# Patient Record
Sex: MALE | Race: OTHER | NOT HISPANIC OR LATINO | Employment: OTHER | URBAN - NONMETROPOLITAN AREA
[De-identification: names, ages, dates, MRNs, and addresses within clinical notes are randomized per-mention and may not be internally consistent; named-entity substitution may affect disease eponyms.]

---

## 2021-01-04 ENCOUNTER — OFFICE VISIT (OUTPATIENT)
Dept: CARDIOLOGY | Facility: CLINIC | Age: 51
End: 2021-01-04

## 2021-01-04 VITALS
SYSTOLIC BLOOD PRESSURE: 155 MMHG | HEIGHT: 72 IN | OXYGEN SATURATION: 99 % | BODY MASS INDEX: 34.51 KG/M2 | TEMPERATURE: 97.1 F | HEART RATE: 63 BPM | DIASTOLIC BLOOD PRESSURE: 84 MMHG | WEIGHT: 254.8 LBS

## 2021-01-04 DIAGNOSIS — R06.02 SHORTNESS OF BREATH: ICD-10-CM

## 2021-01-04 DIAGNOSIS — R07.2 PRECORDIAL PAIN: Primary | ICD-10-CM

## 2021-01-04 DIAGNOSIS — R00.2 PALPITATIONS: ICD-10-CM

## 2021-01-04 PROCEDURE — 99204 OFFICE O/P NEW MOD 45 MIN: CPT | Performed by: PHYSICIAN ASSISTANT

## 2021-01-04 RX ORDER — TAMSULOSIN HYDROCHLORIDE 0.4 MG/1
1 CAPSULE ORAL DAILY
COMMUNITY
End: 2022-03-10

## 2021-01-04 RX ORDER — HYDROCODONE BITARTRATE AND ACETAMINOPHEN 10; 325 MG/1; MG/1
1 TABLET ORAL 2 TIMES DAILY
COMMUNITY
End: 2022-03-10

## 2021-01-04 RX ORDER — NITROGLYCERIN 0.4 MG/1
0.4 TABLET SUBLINGUAL
COMMUNITY
End: 2022-03-10 | Stop reason: SDUPTHER

## 2021-01-04 RX ORDER — OMEPRAZOLE 40 MG/1
40 CAPSULE, DELAYED RELEASE ORAL DAILY
COMMUNITY

## 2021-01-04 NOTE — PROGRESS NOTES
Subjective   Wyatt Aguayo is a 50 y.o. male     Chief Complaint   Patient presents with   • Chest Pain     presents for cardiac eval   • Palpitations   • Shortness of Breath       HPI    This pleasant gentleman presents to the clinic today for initial evaluation.  He is referred because of chest tightness/chest discomfort, also because of his significant family history of CAD.  The patient reports catheterization 10 to 12 years ago which he feels was normal.  He has never had cardiac intervention of any kind by his report.  He tells me that over the last few weeks, he has started noticing chest tightness.  This can occur with exertion or with stress.  This can last for a few minutes and then typically resolves.  Occasionally, particularly during 1 recent episode, he will have referral to the left upper extremity.  He then notes onset of dyspnea which is fairly significant.  He will note palpitations at that time as well.  He has no associated dizziness or syncope.  Symptoms do appear to be progressing and appear to be worsening somewhat.  He reported this to his primary care provider recently and has now been referred on for cardiac evaluation.  The patient denies further cardiovascular complaints or issues otherwise.  Of note, recent laboratories were largely benign.  Lipid parameters were largely stable with the exception of hypertriglyceridemia.  I have reviewed this with him and he opts at this time to proceed with lifestyle modifications.  EKG also performed during his primary care provider's office recently indicated sinus rhythm but nothing acute or nothing significant.    Current Outpatient Medications   Medication Sig Dispense Refill   • HYDROcodone-acetaminophen (NORCO)  MG per tablet Take 1 tablet by mouth 2 (two) times a day.     • nitroglycerin (NITROSTAT) 0.4 MG SL tablet Place 0.4 mg under the tongue Every 5 (Five) Minutes As Needed for Chest Pain. Take no more than 3 doses in 15 minutes.     •  omeprazole (priLOSEC) 40 MG capsule Take 40 mg by mouth Daily.     • sertraline (ZOLOFT) 50 MG tablet Take 50 mg by mouth Daily.     • tamsulosin (FLOMAX) 0.4 MG capsule 24 hr capsule Take 1 capsule by mouth Daily.       No current facility-administered medications for this visit.        Bactrim [sulfamethoxazole-trimethoprim] and Benadryl allergy [diphenhydramine hcl (sleep)]    Past Medical History:   Diagnosis Date   • DDD (degenerative disc disease), lumbar        Social History     Socioeconomic History   • Marital status:      Spouse name: Not on file   • Number of children: Not on file   • Years of education: Not on file   • Highest education level: Not on file   Tobacco Use   • Smoking status: Never Smoker   • Smokeless tobacco: Never Used   Substance and Sexual Activity   • Alcohol use: Yes     Comment: occas   • Drug use: Never       Family History   Problem Relation Age of Onset   • Heart disease Mother    • Heart attack Father        Review of Systems   Constitutional: Positive for diaphoresis (night sweats) and fatigue. Negative for chills and fever.   HENT: Negative.    Eyes: Positive for visual disturbance.   Respiratory: Positive for shortness of breath (on exertion and with CP). Negative for apnea, cough, chest tightness and wheezing.    Cardiovascular: Positive for chest pain (pressure), palpitations (pounding) and leg swelling (R ankle due to previous injury).   Gastrointestinal: Negative.  Negative for abdominal pain, blood in stool, constipation, diarrhea, nausea and vomiting.   Endocrine: Negative.    Genitourinary: Negative.  Negative for hematuria.   Musculoskeletal: Positive for arthralgias and back pain. Negative for myalgias and neck pain.   Skin: Negative.    Allergic/Immunologic: Negative.  Negative for environmental allergies and food allergies.   Neurological: Positive for dizziness (with quick movement). Negative for syncope, weakness, light-headedness, numbness and headaches.  "  Hematological: Negative.  Does not bruise/bleed easily.   Psychiatric/Behavioral: Negative.  Negative for agitation and sleep disturbance. The patient is not nervous/anxious.        Objective     Vitals:    01/04/21 1046   BP: 155/84   BP Location: Left arm   Patient Position: Sitting   Pulse: 63   Temp: 97.1 °F (36.2 °C)   SpO2: 99%   Weight: 116 kg (254 lb 12.8 oz)   Height: 182.9 cm (72\")        /84 (BP Location: Left arm, Patient Position: Sitting)   Pulse 63   Temp 97.1 °F (36.2 °C)   Ht 182.9 cm (72\")   Wt 116 kg (254 lb 12.8 oz)   SpO2 99%   BMI 34.56 kg/m²      Lab Results (most recent)     None          Physical Exam  Vitals signs and nursing note reviewed.   Constitutional:       General: He is not in acute distress.     Appearance: He is well-developed.   HENT:      Head: Normocephalic and atraumatic.   Eyes:      Conjunctiva/sclera: Conjunctivae normal.      Pupils: Pupils are equal, round, and reactive to light.   Neck:      Musculoskeletal: Normal range of motion and neck supple.      Vascular: No JVD.      Trachea: No tracheal deviation.   Cardiovascular:      Rate and Rhythm: Normal rate and regular rhythm.      Heart sounds: Normal heart sounds.   Pulmonary:      Effort: Pulmonary effort is normal.      Breath sounds: Normal breath sounds.   Abdominal:      General: Bowel sounds are normal. There is no distension.      Palpations: Abdomen is soft. There is no mass.      Tenderness: There is no abdominal tenderness. There is no guarding or rebound.   Musculoskeletal: Normal range of motion.         General: No tenderness or deformity.   Skin:     General: Skin is warm and dry.      Coloration: Skin is not pale.      Findings: No erythema or rash.   Neurological:      Mental Status: He is alert and oriented to person, place, and time.   Psychiatric:         Behavior: Behavior normal.         Thought Content: Thought content normal.         Judgment: Judgment normal.         Procedure "   Procedures         Assessment/Plan      Diagnosis Plan   1. Precordial pain  Adult Transthoracic Echo Complete W/ Cont if Necessary Per Protocol    Stress Test With Myocardial Perfusion One Day   2. Shortness of breath  Adult Transthoracic Echo Complete W/ Cont if Necessary Per Protocol    Stress Test With Myocardial Perfusion One Day   3. Palpitations  Adult Transthoracic Echo Complete W/ Cont if Necessary Per Protocol    Stress Test With Myocardial Perfusion One Day     1.  With chest discomfort, dyspnea, palpitations, and symptoms otherwise as outlined above, coupled with family history, I feel the patient needs further testing at this time.    2.  We will schedule for stress test which will serve for ischemia assessment.    3.  We will schedule for an echo as well to evaluate LV size and function, valvular morphologies, etc.    4.  For now, I would continue medical regimen without change.    5.  We will see him back to review results of the above and recommend him further at that time.  He will call for any issues.           Wyatt Olivier  reports that he has never smoked. He has never used smokeless tobacco..         Patient's Body mass index is 34.56 kg/m². BMI is above normal parameters. Recommendations include: educational material.           Electronically signed by:

## 2021-01-04 NOTE — PATIENT INSTRUCTIONS

## 2021-02-05 ENCOUNTER — APPOINTMENT (OUTPATIENT)
Dept: CARDIOLOGY | Facility: HOSPITAL | Age: 51
End: 2021-02-05

## 2021-03-15 ENCOUNTER — APPOINTMENT (OUTPATIENT)
Dept: CARDIOLOGY | Facility: HOSPITAL | Age: 51
End: 2021-03-15

## 2021-03-18 ENCOUNTER — TELEPHONE (OUTPATIENT)
Dept: CARDIOLOGY | Facility: CLINIC | Age: 51
End: 2021-03-18

## 2021-03-18 NOTE — TELEPHONE ENCOUNTER
Patient cancelled testing and then no-showed testing once it was rescheduled.     Spoke w/ Hu Holt's office to make them aware. CORNELL MONSALVE.

## 2021-04-02 ENCOUNTER — HOSPITAL ENCOUNTER (OUTPATIENT)
Dept: CARDIOLOGY | Facility: HOSPITAL | Age: 51
Discharge: HOME | End: 2021-04-02

## 2021-04-02 DIAGNOSIS — R07.2 PRECORDIAL PAIN: ICD-10-CM

## 2021-04-02 DIAGNOSIS — R00.2 PALPITATIONS: ICD-10-CM

## 2021-04-02 DIAGNOSIS — R06.02 SHORTNESS OF BREATH: ICD-10-CM

## 2021-04-02 PROCEDURE — 0 TECHNETIUM SESTAMIBI: Performed by: INTERNAL MEDICINE

## 2021-04-02 PROCEDURE — 93306 TTE W/DOPPLER COMPLETE: CPT

## 2021-04-02 PROCEDURE — 93018 CV STRESS TEST I&R ONLY: CPT | Performed by: INTERNAL MEDICINE

## 2021-04-02 PROCEDURE — 93306 TTE W/DOPPLER COMPLETE: CPT | Performed by: INTERNAL MEDICINE

## 2021-04-02 PROCEDURE — 25010000002 REGADENOSON 0.4 MG/5ML SOLUTION: Performed by: INTERNAL MEDICINE

## 2021-04-02 PROCEDURE — 93017 CV STRESS TEST TRACING ONLY: CPT

## 2021-04-02 PROCEDURE — 93016 CV STRESS TEST SUPVJ ONLY: CPT | Performed by: NURSE PRACTITIONER

## 2021-04-02 PROCEDURE — A9500 TC99M SESTAMIBI: HCPCS | Performed by: INTERNAL MEDICINE

## 2021-04-02 PROCEDURE — 78452 HT MUSCLE IMAGE SPECT MULT: CPT | Performed by: INTERNAL MEDICINE

## 2021-04-02 PROCEDURE — 78452 HT MUSCLE IMAGE SPECT MULT: CPT

## 2021-04-02 RX ADMIN — TECHNETIUM TC 99M SESTAMIBI 1 DOSE: 1 INJECTION INTRAVENOUS at 10:23

## 2021-04-02 RX ADMIN — REGADENOSON 0.4 MG: 0.08 INJECTION, SOLUTION INTRAVENOUS at 10:23

## 2021-04-02 RX ADMIN — TECHNETIUM TC 99M SESTAMIBI 1 DOSE: 1 INJECTION INTRAVENOUS at 08:57

## 2021-04-04 LAB
BH CV REST NUCLEAR ISOTOPE DOSE: 10 MCI
BH CV STRESS COMMENTS STAGE 1: NORMAL
BH CV STRESS DOSE REGADENOSON STAGE 1: 0.4
BH CV STRESS DURATION MIN STAGE 1: 0
BH CV STRESS DURATION SEC STAGE 1: 10
BH CV STRESS NUCLEAR ISOTOPE DOSE: 30 MCI
BH CV STRESS PROTOCOL 1: NORMAL
BH CV STRESS RECOVERY BP: NORMAL MMHG
BH CV STRESS RECOVERY HR: 56 BPM
BH CV STRESS STAGE 1: 1
MAXIMAL PREDICTED HEART RATE: 171 BPM
PERCENT MAX PREDICTED HR: 36.84 %
STRESS BASELINE BP: NORMAL MMHG
STRESS BASELINE HR: 51 BPM
STRESS PERCENT HR: 43 %
STRESS POST PEAK BP: NORMAL MMHG
STRESS POST PEAK HR: 63 BPM
STRESS TARGET HR: 145 BPM

## 2021-04-07 ENCOUNTER — TELEPHONE (OUTPATIENT)
Dept: CARDIOLOGY | Facility: CLINIC | Age: 51
End: 2021-04-07

## 2021-04-07 NOTE — TELEPHONE ENCOUNTER
Spoke to patient testing results - normal , keep follow up.      Patient verbalized understanding     AT [facility]             ----- Message from SOLIS Melendez sent at 4/7/2021 10:59 AM EDT -----  Routine follow-up.

## 2021-04-14 LAB
BH CV ECHO MEAS - AO MAX PG (FULL): 5.5 MMHG
BH CV ECHO MEAS - AO MAX PG: 10.8 MMHG
BH CV ECHO MEAS - AO MEAN PG (FULL): 2 MMHG
BH CV ECHO MEAS - AO MEAN PG: 5 MMHG
BH CV ECHO MEAS - AO ROOT AREA (BSA CORRECTED): 1.4
BH CV ECHO MEAS - AO ROOT AREA: 8 CM^2
BH CV ECHO MEAS - AO ROOT DIAM: 3.2 CM
BH CV ECHO MEAS - AO V2 MAX: 164 CM/SEC
BH CV ECHO MEAS - AO V2 MEAN: 110 CM/SEC
BH CV ECHO MEAS - AO V2 VTI: 35.9 CM
BH CV ECHO MEAS - ASC AORTA: 3 CM
BH CV ECHO MEAS - BSA(HAYCOCK): 2.5 M^2
BH CV ECHO MEAS - BSA: 2.4 M^2
BH CV ECHO MEAS - BZI_BMI: 34.4 KILOGRAMS/M^2
BH CV ECHO MEAS - BZI_METRIC_HEIGHT: 182.9 CM
BH CV ECHO MEAS - BZI_METRIC_WEIGHT: 115.2 KG
BH CV ECHO MEAS - EDV(CUBED): 116.2 ML
BH CV ECHO MEAS - EDV(MOD-SP2): 73.3 ML
BH CV ECHO MEAS - EDV(MOD-SP4): 71.8 ML
BH CV ECHO MEAS - EDV(TEICH): 111.7 ML
BH CV ECHO MEAS - EF(CUBED): 67.9 %
BH CV ECHO MEAS - EF(MOD-SP2): 68.5 %
BH CV ECHO MEAS - EF(MOD-SP4): 62.1 %
BH CV ECHO MEAS - EF(TEICH): 59.3 %
BH CV ECHO MEAS - ESV(CUBED): 37.3 ML
BH CV ECHO MEAS - ESV(MOD-SP2): 23.1 ML
BH CV ECHO MEAS - ESV(MOD-SP4): 27.2 ML
BH CV ECHO MEAS - ESV(TEICH): 45.4 ML
BH CV ECHO MEAS - FS: 31.6 %
BH CV ECHO MEAS - IVS/LVPW: 1
BH CV ECHO MEAS - IVSD: 1 CM
BH CV ECHO MEAS - LA DIMENSION: 3.8 CM
BH CV ECHO MEAS - LA/AO: 1.2
BH CV ECHO MEAS - LV DIASTOLIC VOL/BSA (35-75): 30.4 ML/M^2
BH CV ECHO MEAS - LV IVRT: 0.1 SEC
BH CV ECHO MEAS - LV MASS(C)D: 184.4 GRAMS
BH CV ECHO MEAS - LV MASS(C)DI: 78.2 GRAMS/M^2
BH CV ECHO MEAS - LV MAX PG: 5.3 MMHG
BH CV ECHO MEAS - LV MEAN PG: 3 MMHG
BH CV ECHO MEAS - LV SYSTOLIC VOL/BSA (12-30): 11.5 ML/M^2
BH CV ECHO MEAS - LV V1 MAX: 115 CM/SEC
BH CV ECHO MEAS - LV V1 MEAN: 81.5 CM/SEC
BH CV ECHO MEAS - LV V1 VTI: 24.6 CM
BH CV ECHO MEAS - LVIDD: 4.9 CM
BH CV ECHO MEAS - LVIDS: 3.3 CM
BH CV ECHO MEAS - LVLD AP2: 7.2 CM
BH CV ECHO MEAS - LVLD AP4: 8.1 CM
BH CV ECHO MEAS - LVLS AP2: 6 CM
BH CV ECHO MEAS - LVLS AP4: 6.3 CM
BH CV ECHO MEAS - LVPWD: 1 CM
BH CV ECHO MEAS - MR MAX PG: 23.6 MMHG
BH CV ECHO MEAS - MR MAX VEL: 243 CM/SEC
BH CV ECHO MEAS - MV A MAX VEL: 75.8 CM/SEC
BH CV ECHO MEAS - MV DEC SLOPE: 481 CM/SEC^2
BH CV ECHO MEAS - MV DEC TIME: 0.21 SEC
BH CV ECHO MEAS - MV E MAX VEL: 99.2 CM/SEC
BH CV ECHO MEAS - MV E/A: 1.3
BH CV ECHO MEAS - MV MAX PG: 4.8 MMHG
BH CV ECHO MEAS - MV MEAN PG: 2 MMHG
BH CV ECHO MEAS - MV P1/2T MAX VEL: 111 CM/SEC
BH CV ECHO MEAS - MV P1/2T: 67.6 MSEC
BH CV ECHO MEAS - MV V2 MAX: 110 CM/SEC
BH CV ECHO MEAS - MV V2 MEAN: 56.7 CM/SEC
BH CV ECHO MEAS - MV V2 VTI: 38.4 CM
BH CV ECHO MEAS - MVA P1/2T LCG: 2 CM^2
BH CV ECHO MEAS - MVA(P1/2T): 3.3 CM^2
BH CV ECHO MEAS - PA ACC TIME: 0.11 SEC
BH CV ECHO MEAS - PA MAX PG: 4.8 MMHG
BH CV ECHO MEAS - PA MEAN PG: 3 MMHG
BH CV ECHO MEAS - PA PR(ACCEL): 31.3 MMHG
BH CV ECHO MEAS - PA V2 MAX: 110 CM/SEC
BH CV ECHO MEAS - PA V2 MEAN: 85.5 CM/SEC
BH CV ECHO MEAS - PA V2 VTI: 28 CM
BH CV ECHO MEAS - PULM A REVS DUR: 0.13 SEC
BH CV ECHO MEAS - PULM A REVS VEL: 31.7 CM/SEC
BH CV ECHO MEAS - PULM DIAS VEL: 51 CM/SEC
BH CV ECHO MEAS - PULM S/D: 1
BH CV ECHO MEAS - PULM SYS VEL: 52.3 CM/SEC
BH CV ECHO MEAS - RAP SYSTOLE: 10 MMHG
BH CV ECHO MEAS - RVDD: 3.2 CM
BH CV ECHO MEAS - RVSP: 22.3 MMHG
BH CV ECHO MEAS - SI(AO): 122.4 ML/M^2
BH CV ECHO MEAS - SI(CUBED): 33.5 ML/M^2
BH CV ECHO MEAS - SI(MOD-SP2): 21.3 ML/M^2
BH CV ECHO MEAS - SI(MOD-SP4): 18.9 ML/M^2
BH CV ECHO MEAS - SI(TEICH): 28.1 ML/M^2
BH CV ECHO MEAS - SV(AO): 288.7 ML
BH CV ECHO MEAS - SV(CUBED): 79 ML
BH CV ECHO MEAS - SV(MOD-SP2): 50.2 ML
BH CV ECHO MEAS - SV(MOD-SP4): 44.6 ML
BH CV ECHO MEAS - SV(TEICH): 66.3 ML
BH CV ECHO MEAS - TR MAX VEL: 175 CM/SEC
MAXIMAL PREDICTED HEART RATE: 171 BPM
STRESS TARGET HR: 145 BPM

## 2022-03-10 ENCOUNTER — OFFICE VISIT (OUTPATIENT)
Dept: CARDIOLOGY | Facility: CLINIC | Age: 52
End: 2022-03-10

## 2022-03-10 VITALS
HEIGHT: 70 IN | SYSTOLIC BLOOD PRESSURE: 138 MMHG | WEIGHT: 257 LBS | DIASTOLIC BLOOD PRESSURE: 83 MMHG | OXYGEN SATURATION: 97 % | HEART RATE: 71 BPM | BODY MASS INDEX: 36.79 KG/M2

## 2022-03-10 DIAGNOSIS — R06.02 SHORTNESS OF BREATH: ICD-10-CM

## 2022-03-10 DIAGNOSIS — R07.2 PRECORDIAL PAIN: Primary | ICD-10-CM

## 2022-03-10 DIAGNOSIS — I73.9 CLAUDICATION: ICD-10-CM

## 2022-03-10 DIAGNOSIS — R53.83 FATIGUE, UNSPECIFIED TYPE: ICD-10-CM

## 2022-03-10 DIAGNOSIS — R53.1 WEAKNESS: ICD-10-CM

## 2022-03-10 PROCEDURE — 99214 OFFICE O/P EST MOD 30 MIN: CPT | Performed by: PHYSICIAN ASSISTANT

## 2022-03-10 PROCEDURE — 93000 ELECTROCARDIOGRAM COMPLETE: CPT | Performed by: PHYSICIAN ASSISTANT

## 2022-03-10 RX ORDER — CLOPIDOGREL BISULFATE 75 MG/1
75 TABLET ORAL DAILY
COMMUNITY

## 2022-03-10 RX ORDER — UBIDECARENONE 100 MG
100 CAPSULE ORAL DAILY
COMMUNITY

## 2022-03-10 RX ORDER — CLONIDINE HYDROCHLORIDE 0.1 MG/1
0.1 TABLET ORAL 2 TIMES DAILY
COMMUNITY

## 2022-03-10 RX ORDER — OLMESARTAN MEDOXOMIL 20 MG/1
20 TABLET ORAL DAILY
Qty: 30 TABLET | Refills: 3 | Status: SHIPPED | OUTPATIENT
Start: 2022-03-10 | End: 2022-06-29

## 2022-03-10 RX ORDER — NITROGLYCERIN 0.4 MG/1
0.4 TABLET SUBLINGUAL
Qty: 25 TABLET | Refills: 2 | Status: SHIPPED | OUTPATIENT
Start: 2022-03-10

## 2022-03-10 RX ORDER — HYDROCODONE BITARTRATE AND ACETAMINOPHEN 10; 325 MG/1; MG/1
1 TABLET ORAL EVERY 6 HOURS PRN
COMMUNITY

## 2022-03-10 RX ORDER — ASPIRIN 81 MG/1
81 TABLET ORAL DAILY
COMMUNITY

## 2022-03-10 RX ORDER — ATORVASTATIN CALCIUM 80 MG/1
80 TABLET, FILM COATED ORAL DAILY
COMMUNITY

## 2022-03-10 RX ORDER — AMLODIPINE BESYLATE 10 MG/1
10 TABLET ORAL DAILY
COMMUNITY

## 2022-03-10 RX ORDER — LISINOPRIL 5 MG/1
5 TABLET ORAL DAILY
COMMUNITY
End: 2022-03-10

## 2022-03-10 NOTE — PROGRESS NOTES
Problem list     Subjective   Wyatt Aguayo is a 51 y.o. male     Chief Complaint   Patient presents with   • Follow-up     1 year   • Chest Pain     Post stroke 3 weeks   • Shortness of Breath       HPI  The patient presents back today for evaluation.  We had seen him last in January, 2021.  He was scheduled for cardiac evaluation at that time.  His stress and echo studies at that time were benign.  The patient was lost to follow-up but now presents back after recent hospitalization.  He presented with CVA and eventually had a left carotid endarterectomy.  Medications were adjusted appropriately after CVA and CEA and he presents today to reestablish care.  He just is not doing well.  Since his stroke, he has had ongoing chest pain/tightness.  He has increasing dyspnea.  His fatigue and exercise intolerance is significant.  The patient has no further complaints otherwise and presents for evaluation because of ongoing symptoms.    Current Outpatient Medications on File Prior to Visit   Medication Sig Dispense Refill   • amLODIPine (NORVASC) 10 MG tablet Take 10 mg by mouth Daily.     • aspirin 81 MG EC tablet Take 81 mg by mouth Daily.     • atorvastatin (LIPITOR) 80 MG tablet Take 80 mg by mouth Daily.     • cloNIDine (CATAPRES) 0.1 MG tablet Take 0.1 mg by mouth 2 (Two) Times a Day.     • clopidogrel (PLAVIX) 75 MG tablet Take 75 mg by mouth Daily.     • coenzyme Q10 100 MG capsule Take 100 mg by mouth Daily.     • HYDROcodone-acetaminophen (NORCO)  MG per tablet Take 1 tablet by mouth Every 6 (Six) Hours As Needed for Moderate Pain .     • omeprazole (priLOSEC) 40 MG capsule Take 40 mg by mouth Daily.     • sertraline (ZOLOFT) 50 MG tablet Take 50 mg by mouth Daily.     • [DISCONTINUED] lisinopril (PRINIVIL,ZESTRIL) 5 MG tablet Take 5 mg by mouth Daily.     • [DISCONTINUED] nitroglycerin (NITROSTAT) 0.4 MG SL tablet Place 0.4 mg under the tongue Every 5 (Five) Minutes As Needed for Chest Pain. Take no more  than 3 doses in 15 minutes.     • [DISCONTINUED] HYDROcodone-acetaminophen (NORCO)  MG per tablet Take 1 tablet by mouth 2 (two) times a day.     • [DISCONTINUED] tamsulosin (FLOMAX) 0.4 MG capsule 24 hr capsule Take 1 capsule by mouth Daily.       No current facility-administered medications on file prior to visit.       Bactrim [sulfamethoxazole-trimethoprim] and Benadryl allergy [diphenhydramine hcl (sleep)]    Past Medical History:   Diagnosis Date   • Carotid artery disease (HCC)    • DDD (degenerative disc disease), lumbar    • Stroke (HCC)        Social History     Socioeconomic History   • Marital status:    Tobacco Use   • Smoking status: Never Smoker   • Smokeless tobacco: Never Used   Substance and Sexual Activity   • Alcohol use: Yes     Comment: occas   • Drug use: Never       Family History   Problem Relation Age of Onset   • Heart disease Mother    • Heart attack Father        Review of Systems   Constitutional: Positive for fatigue. Negative for chills and fever.   HENT: Positive for congestion. Negative for rhinorrhea and sore throat.    Eyes: Positive for visual disturbance (glasses).   Respiratory: Positive for chest tightness and shortness of breath. Negative for wheezing.    Cardiovascular: Positive for chest pain and palpitations. Negative for leg swelling.   Gastrointestinal: Negative.    Endocrine: Negative.    Genitourinary: Negative.    Musculoskeletal: Positive for back pain and neck pain. Negative for arthralgias.   Skin: Negative.  Negative for rash and wound.   Allergic/Immunologic: Negative.  Negative for environmental allergies.   Neurological: Positive for weakness and numbness (arms ). Negative for dizziness and headaches.   Hematological: Bruises/bleeds easily (bruises / bleeds).   Psychiatric/Behavioral: Negative.  Negative for sleep disturbance.       Objective   Vitals:    03/10/22 1615   BP: 138/83   BP Location: Left arm   Patient Position: Sitting   Pulse: 71  "  SpO2: 97%   Weight: 117 kg (257 lb)   Height: 177.8 cm (70\")      /83 (BP Location: Left arm, Patient Position: Sitting)   Pulse 71   Ht 177.8 cm (70\")   Wt 117 kg (257 lb)   SpO2 97%   BMI 36.88 kg/m²    Lab Results (most recent)     None        Physical Exam  Vitals and nursing note reviewed.   Constitutional:       General: He is not in acute distress.     Appearance: He is well-developed.   HENT:      Head: Normocephalic and atraumatic.   Eyes:      Conjunctiva/sclera: Conjunctivae normal.      Pupils: Pupils are equal, round, and reactive to light.   Neck:      Vascular: No JVD.      Trachea: No tracheal deviation.   Cardiovascular:      Rate and Rhythm: Normal rate and regular rhythm.      Heart sounds: Normal heart sounds.   Pulmonary:      Effort: Pulmonary effort is normal.      Breath sounds: Normal breath sounds.   Abdominal:      General: Bowel sounds are normal. There is no distension.      Palpations: Abdomen is soft. There is no mass.      Tenderness: There is no abdominal tenderness. There is no guarding or rebound.   Musculoskeletal:         General: No tenderness or deformity. Normal range of motion.      Cervical back: Normal range of motion and neck supple.   Skin:     General: Skin is warm and dry.      Coloration: Skin is not pale.      Findings: No erythema or rash.   Neurological:      Mental Status: He is alert and oriented to person, place, and time.   Psychiatric:         Behavior: Behavior normal.         Thought Content: Thought content normal.         Judgment: Judgment normal.           Procedure     ECG 12 Lead    Date/Time: 3/10/2022 4:21 PM  Performed by: Freddy Main PA  Authorized by: Freddy Main PA   Comparison: compared with previous ECG from 12/7/2020  Comparison to previous ECG: Sinus rhythm, rate 68, normal axis, RSR prime V1 V2, minor nonspecific ST and T wave changes, no acute changes noted.                 Assessment/Plan      Diagnosis Plan   1. " Precordial pain  Stress Test With Myocardial Perfusion One Day    Duplex Lower Extremity Art / Grafts - Bilateral CAR   2. Claudication (HCC)  Stress Test With Myocardial Perfusion One Day    Duplex Lower Extremity Art / Grafts - Bilateral CAR   3. Fatigue, unspecified type  Stress Test With Myocardial Perfusion One Day    Duplex Lower Extremity Art / Grafts - Bilateral CAR   4. Weakness  Stress Test With Myocardial Perfusion One Day    Duplex Lower Extremity Art / Grafts - Bilateral CAR   5. Shortness of breath  Stress Test With Myocardial Perfusion One Day    Duplex Lower Extremity Art / Grafts - Bilateral CAR       1.  The patient presents for hospital follow-up, admitted recently following CVA and subsequent left carotid endarterectomy.  For now, I would continue dual antiplatelet and high-dose statin therapy.    2.  I am concerned with his current symptoms.  He reports significant chest tightness, dyspnea, profound weakness, and progressive exercise intolerance.  His stress and his echo last year were unremarkable, performed April, 2021.  I am very concerned however with symptoms potentially representing anginal equivalent issues.  Will be scheduled for nuclear stress testing in that setting.    3.  He has leg pain concerning for claudication.  With known vascular disease, he will be scheduled for arterial duplex to evaluate that further.    4.  We reviewed his previous stress and echo findings in detail with him.  If the above studies were benign, nothing further will be indicated from general cardiovascular standpoint and less clinical course should change.  He will call in that setting.    5.  I will make no adjustments.  We will await results of above and proceed further.  He will return immediately for any recurrent issues.                      Electronically signed by:

## 2022-03-14 ENCOUNTER — HOSPITAL ENCOUNTER (OUTPATIENT)
Dept: CARDIOLOGY | Facility: HOSPITAL | Age: 52
Discharge: HOME | End: 2022-03-14
Admitting: PHYSICIAN ASSISTANT

## 2022-03-14 DIAGNOSIS — R06.02 SHORTNESS OF BREATH: ICD-10-CM

## 2022-03-14 DIAGNOSIS — R53.83 FATIGUE, UNSPECIFIED TYPE: ICD-10-CM

## 2022-03-14 DIAGNOSIS — R53.1 WEAKNESS: ICD-10-CM

## 2022-03-14 DIAGNOSIS — R07.2 PRECORDIAL PAIN: ICD-10-CM

## 2022-03-14 DIAGNOSIS — I73.9 CLAUDICATION: ICD-10-CM

## 2022-03-14 PROCEDURE — 93925 LOWER EXTREMITY STUDY: CPT

## 2022-03-14 PROCEDURE — 93925 LOWER EXTREMITY STUDY: CPT | Performed by: INTERNAL MEDICINE

## 2022-03-23 ENCOUNTER — HOSPITAL ENCOUNTER (OUTPATIENT)
Dept: CARDIOLOGY | Facility: HOSPITAL | Age: 52
Discharge: HOME | End: 2022-03-23

## 2022-03-23 ENCOUNTER — APPOINTMENT (OUTPATIENT)
Dept: CARDIOLOGY | Facility: HOSPITAL | Age: 52
End: 2022-03-23

## 2022-03-23 ENCOUNTER — HOSPITAL ENCOUNTER (OUTPATIENT)
Dept: CARDIOLOGY | Facility: HOSPITAL | Age: 52
End: 2022-03-23

## 2022-03-23 DIAGNOSIS — R07.2 PRECORDIAL PAIN: ICD-10-CM

## 2022-03-23 DIAGNOSIS — R06.02 SHORTNESS OF BREATH: ICD-10-CM

## 2022-03-23 DIAGNOSIS — R53.1 WEAKNESS: ICD-10-CM

## 2022-03-23 DIAGNOSIS — R53.83 FATIGUE, UNSPECIFIED TYPE: ICD-10-CM

## 2022-03-23 DIAGNOSIS — I73.9 CLAUDICATION: ICD-10-CM

## 2022-03-23 PROCEDURE — 93018 CV STRESS TEST I&R ONLY: CPT | Performed by: INTERNAL MEDICINE

## 2022-03-23 PROCEDURE — A9500 TC99M SESTAMIBI: HCPCS | Performed by: INTERNAL MEDICINE

## 2022-03-23 PROCEDURE — 78452 HT MUSCLE IMAGE SPECT MULT: CPT | Performed by: INTERNAL MEDICINE

## 2022-03-23 PROCEDURE — 93017 CV STRESS TEST TRACING ONLY: CPT

## 2022-03-23 PROCEDURE — 78452 HT MUSCLE IMAGE SPECT MULT: CPT

## 2022-03-23 PROCEDURE — 0 TECHNETIUM SESTAMIBI: Performed by: INTERNAL MEDICINE

## 2022-03-23 RX ADMIN — TECHNETIUM TC 99M SESTAMIBI 1 DOSE: 1 INJECTION INTRAVENOUS at 12:24

## 2022-03-24 ENCOUNTER — HOSPITAL ENCOUNTER (OUTPATIENT)
Dept: CARDIOLOGY | Facility: HOSPITAL | Age: 52
Discharge: HOME | End: 2022-03-24

## 2022-03-24 PROCEDURE — 25010000002 REGADENOSON 0.4 MG/5ML SOLUTION: Performed by: INTERNAL MEDICINE

## 2022-03-24 PROCEDURE — A9500 TC99M SESTAMIBI: HCPCS | Performed by: INTERNAL MEDICINE

## 2022-03-24 PROCEDURE — 0 TECHNETIUM SESTAMIBI: Performed by: INTERNAL MEDICINE

## 2022-03-24 RX ADMIN — TECHNETIUM TC 99M SESTAMIBI 1 DOSE: 1 INJECTION INTRAVENOUS at 10:05

## 2022-03-24 RX ADMIN — REGADENOSON 0.4 MG: 0.08 INJECTION, SOLUTION INTRAVENOUS at 10:05

## 2022-03-25 ENCOUNTER — APPOINTMENT (OUTPATIENT)
Dept: CARDIOLOGY | Facility: HOSPITAL | Age: 52
End: 2022-03-25

## 2022-03-27 LAB
BH CV ECHO MEAS - BSA(HAYCOCK): 2.4 M^2
BH CV ECHO MEAS - BSA: 2.3 M^2
BH CV ECHO MEAS - BZI_BMI: 36.9 KILOGRAMS/M^2
BH CV ECHO MEAS - BZI_METRIC_HEIGHT: 177.8 CM
BH CV ECHO MEAS - BZI_METRIC_WEIGHT: 116.6 KG
BH CV LEA LEFT ANT TIBIAL A DISTAL PSV: 86 CM/S
BH CV LEA LEFT CFA PROX PSV: 139 CM/S
BH CV LEA LEFT DFA PROX PSV: 84 CM/S
BH CV LEA LEFT POPITEAL A  PROX PSV: 70 CM/S
BH CV LEA LEFT PTA DISTAL PSV: 96 CM/S
BH CV LEA LEFT SFA DISTAL PSV: 76 CM/S
BH CV LEA LEFT SFA MID PSV: 102 CM/S
BH CV LEA LEFT SFA PROX PSV: 104 CM/S
BH CV LEA RIGHT ANT TIBIAL A DISTAL PSV: 99 CM/S
BH CV LEA RIGHT CFA PROX PSV: 161 CM/S
BH CV LEA RIGHT DFA PROX PSV: 121 CM/S
BH CV LEA RIGHT POPITEAL A  PROX PSV: 71 CM/S
BH CV LEA RIGHT PTA DISTAL PSV: 86 CM/S
BH CV LEA RIGHT SFA DISTAL PSV: 89 CM/S
BH CV LEA RIGHT SFA MID PSV: 97 CM/S
BH CV LEA RIGHT SFA PROX PSV: 110 CM/S
BH CV REST NUCLEAR ISOTOPE DOSE: 10 MCI
BH CV STRESS COMMENTS STAGE 1: NORMAL
BH CV STRESS DOSE REGADENOSON STAGE 1: 0.4
BH CV STRESS DURATION MIN STAGE 1: 0
BH CV STRESS DURATION SEC STAGE 1: 10
BH CV STRESS NUCLEAR ISOTOPE DOSE: 20 MCI
BH CV STRESS PROTOCOL 1: NORMAL
BH CV STRESS RECOVERY BP: NORMAL MMHG
BH CV STRESS RECOVERY HR: 72 BPM
BH CV STRESS STAGE 1: 1
DIST ATA PSV LEFT: 86.7 CM/SEC
DIST ATA PSV RIGHT: 99.9 CM/SEC
DIST PTA PSV LEFT: 96.8 CM/SEC
DIST PTA PSV RIGHT: 86.6 CM/SEC
DIST SFA PSV LEFT: -76.7 CM/SEC
DIST SFA PSV RIGHT: -90.1 CM/SEC
LEFT CFA PROX SYS PSV: 139.7 CM/SEC
MAXIMAL PREDICTED HEART RATE: 170 BPM
MAXIMAL PREDICTED HEART RATE: 170 BPM
MID SFA PSV LEFT: -102.5 CM/SEC
MID SFA PSV RIGHT: -97.8 CM/SEC
PERCENT MAX PREDICTED HR: 43.53 %
PROX PFA PSV LEFT: -84.9 CM/SEC
PROX PFA PSV RIGHT: -121.9 CM/SEC
PROX SFA PSV LEFT: -104.3 CM/SEC
PROX SFA PSV RIGHT: -110.3 CM/SEC
RIGHT CFA PROX SYS PSV: 162.8 CM/SEC
STRESS BASELINE BP: NORMAL MMHG
STRESS BASELINE HR: 68 BPM
STRESS PERCENT HR: 51 %
STRESS POST PEAK BP: NORMAL MMHG
STRESS POST PEAK HR: 74 BPM
STRESS TARGET HR: 145 BPM
STRESS TARGET HR: 145 BPM

## 2022-03-28 ENCOUNTER — TELEPHONE (OUTPATIENT)
Dept: CARDIOLOGY | Facility: CLINIC | Age: 52
End: 2022-03-28

## 2022-03-28 NOTE — TELEPHONE ENCOUNTER
US  Pt notified of no acute findings. Provider will discuss results at f/u. Pt reminded of appt date and time.    ----- Message from Jacqueline Meeks MA sent at 3/28/2022  9:17 AM EDT -----    ----- Message -----  From: Freddy Main PA  Sent: 3/28/2022   9:07 AM EDT  To: Jacqueline Meeks MA    Routine follow-up.

## 2022-04-06 ENCOUNTER — TELEPHONE (OUTPATIENT)
Dept: CARDIOLOGY | Facility: CLINIC | Age: 52
End: 2022-04-06

## 2022-04-06 NOTE — TELEPHONE ENCOUNTER
Left VM for patient on stress test    Heart pump function good / no indication of decreased blood flow     Keep routine follow but please call with any questions or concerns     AT [facility]         ----- Message from SOLIS Melendez sent at 3/31/2022  9:04 AM EDT -----  Routine follow-up.  For ongoing symptoms, please see me.

## 2022-06-28 ENCOUNTER — TELEPHONE (OUTPATIENT)
Dept: CARDIOLOGY | Facility: CLINIC | Age: 52
End: 2022-06-28

## 2022-06-28 NOTE — TELEPHONE ENCOUNTER
"Patients Pharmacy called and states, that he was sent in Lisinopril 10 mg daily(By ) and on Olmesartan 20 mg daily. She states, he mentioned that one of the medications was causing him to cough,she states \"and I told him it would be the lisinopril.\" She wanted clarification on what he was taking.    I called and spoke with patient and he states he is not sure which one he needs to be on, I advised would call  and call him back for clarification.    Called and spoke with Maranda at  and address we D/c the lisionpril, she is going to send message to  and call back with clarification.    Patients BP at their office yesterday was 140/78, and he stated to them it was high at home but has no readings.  "

## 2022-06-29 RX ORDER — OLMESARTAN MEDOXOMIL 40 MG/1
40 TABLET ORAL DAILY
COMMUNITY

## 2022-06-29 NOTE — TELEPHONE ENCOUNTER
Called and spoke with Gladys at  office and she states he is going to increase his Olmesartan 40 mg daily and D.C the lisinopril, and will monitor.    She states she will call and notifiy patient.    I have updated chart.    JONA ISIDRO MA